# Patient Record
Sex: FEMALE | Race: WHITE | URBAN - METROPOLITAN AREA
[De-identification: names, ages, dates, MRNs, and addresses within clinical notes are randomized per-mention and may not be internally consistent; named-entity substitution may affect disease eponyms.]

---

## 2022-01-20 ENCOUNTER — WEB ENCOUNTER (OUTPATIENT)
Dept: URBAN - METROPOLITAN AREA CLINIC 98 | Facility: CLINIC | Age: 52
End: 2022-01-20

## 2022-01-20 ENCOUNTER — OFFICE VISIT (OUTPATIENT)
Dept: URBAN - METROPOLITAN AREA CLINIC 98 | Facility: CLINIC | Age: 52
End: 2022-01-20
Payer: COMMERCIAL

## 2022-01-20 DIAGNOSIS — Z80.0 FAMILY HISTORY OF COLON CANCER: ICD-10-CM

## 2022-01-20 DIAGNOSIS — R10.13 DYSPEPSIA: ICD-10-CM

## 2022-01-20 PROCEDURE — 99214 OFFICE O/P EST MOD 30 MIN: CPT | Performed by: INTERNAL MEDICINE

## 2022-01-20 RX ORDER — LIRAGLUTIDE 6 MG/ML
AS DIRECTED INJECTION, SOLUTION SUBCUTANEOUS
Status: ACTIVE | COMMUNITY

## 2022-01-20 RX ORDER — POLYETHYLENE GLYCOL 3350, SODIUM CHLORIDE, SODIUM BICARBONATE, POTASSIUM CHLORIDE 420; 11.2; 5.72; 1.48 G/4L; G/4L; G/4L; G/4L
AS DIRECTED POWDER, FOR SOLUTION ORAL ONCE
Qty: 420 GM | Refills: 0 | OUTPATIENT
Start: 2022-01-20 | End: 2022-01-21

## 2022-01-20 RX ORDER — PANTOPRAZOLE SODIUM 40 MG/1
1 TABLET TABLET, DELAYED RELEASE ORAL ONCE A DAY
Status: ACTIVE | COMMUNITY

## 2022-01-20 RX ORDER — PANTOPRAZOLE SODIUM 40 MG/1
1 TABLET TABLET, DELAYED RELEASE ORAL ONCE A DAY
OUTPATIENT

## 2022-01-20 RX ORDER — LISDEXAMFETAMINE DIMESYLATE 60 MG/1
1 CAPSULE IN THE MORNING CAPSULE ORAL ONCE A DAY
Status: ACTIVE | COMMUNITY

## 2022-01-20 RX ORDER — CLONAZEPAM 1 MG/1
TABLET ORAL
Qty: 0 | Refills: 0 | Status: ACTIVE | COMMUNITY
Start: 1900-01-01

## 2022-01-20 NOTE — HPI-TODAY'S VISIT:
Ms. Wilson is a 52 yo F presenting for followup for colonoscopy. Her last was in 2019 with suboptimal prep. Her father and his siblings had colon cancer.   In 2019 she had cholecystectomy for biliary colic.   She is on sour stomach while taking Saxenda for weight loss. She has a history of heartburn requiring omeprazole in the past and switched to pantoprazole 40 mg every few days (needs it 3-4 days per week). These medications help with her sour stomach.   No fever or chills. She does have some occasional lower back pain.   She has a soft BM daily, no blood.   I personally reviewed:  3/2019 colonoscopy: stool in colon, diverticulosis. Next colonoscopy due in 3 years (2022)

## 2022-03-14 ENCOUNTER — WEB ENCOUNTER (OUTPATIENT)
Dept: URBAN - METROPOLITAN AREA SURGERY CENTER 18 | Facility: SURGERY CENTER | Age: 52
End: 2022-03-14

## 2022-03-15 ENCOUNTER — OFFICE VISIT (OUTPATIENT)
Dept: URBAN - METROPOLITAN AREA SURGERY CENTER 18 | Facility: SURGERY CENTER | Age: 52
End: 2022-03-15
Payer: COMMERCIAL

## 2022-03-15 ENCOUNTER — CLAIMS CREATED FROM THE CLAIM WINDOW (OUTPATIENT)
Dept: URBAN - METROPOLITAN AREA CLINIC 4 | Facility: CLINIC | Age: 52
End: 2022-03-15
Payer: COMMERCIAL

## 2022-03-15 DIAGNOSIS — Z80.0 BROTHER AT YOUNG AGE FAMILY HISTORY OF COLON CANCER: ICD-10-CM

## 2022-03-15 DIAGNOSIS — K63.89 BACTERIAL OVERGROWTH SYNDROME: ICD-10-CM

## 2022-03-15 DIAGNOSIS — K63.89 SMALL BOWEL EDEMA: ICD-10-CM

## 2022-03-15 PROCEDURE — 45380 COLONOSCOPY AND BIOPSY: CPT | Performed by: INTERNAL MEDICINE

## 2022-03-15 PROCEDURE — 88305 TISSUE EXAM BY PATHOLOGIST: CPT | Performed by: PATHOLOGY

## 2022-03-15 PROCEDURE — G8907 PT DOC NO EVENTS ON DISCHARG: HCPCS | Performed by: INTERNAL MEDICINE

## 2022-03-15 RX ORDER — CLONAZEPAM 1 MG/1
TABLET ORAL
Qty: 0 | Refills: 0 | Status: ACTIVE | COMMUNITY
Start: 1900-01-01

## 2022-03-15 RX ORDER — PANTOPRAZOLE SODIUM 40 MG/1
1 TABLET TABLET, DELAYED RELEASE ORAL ONCE A DAY
Status: ACTIVE | COMMUNITY

## 2022-03-15 RX ORDER — LIRAGLUTIDE 6 MG/ML
AS DIRECTED INJECTION, SOLUTION SUBCUTANEOUS
Status: ACTIVE | COMMUNITY

## 2022-03-15 RX ORDER — LISDEXAMFETAMINE DIMESYLATE 60 MG/1
1 CAPSULE IN THE MORNING CAPSULE ORAL ONCE A DAY
Status: ACTIVE | COMMUNITY

## 2023-03-01 ENCOUNTER — DASHBOARD ENCOUNTERS (OUTPATIENT)
Age: 53
End: 2023-03-01

## 2023-03-01 ENCOUNTER — OFFICE VISIT (OUTPATIENT)
Dept: URBAN - METROPOLITAN AREA CLINIC 98 | Facility: CLINIC | Age: 53
End: 2023-03-01
Payer: COMMERCIAL

## 2023-03-01 VITALS
HEART RATE: 85 BPM | DIASTOLIC BLOOD PRESSURE: 87 MMHG | SYSTOLIC BLOOD PRESSURE: 197 MMHG | HEIGHT: 65 IN | TEMPERATURE: 97.85 F

## 2023-03-01 DIAGNOSIS — R12 HEARTBURN: ICD-10-CM

## 2023-03-01 DIAGNOSIS — M54.6 PAIN IN THORACIC SPINE: ICD-10-CM

## 2023-03-01 DIAGNOSIS — G89.29 OTHER CHRONIC PAIN: ICD-10-CM

## 2023-03-01 DIAGNOSIS — K57.92 DIVERTICULITIS: ICD-10-CM

## 2023-03-01 DIAGNOSIS — R10.9 LEFT SIDED ABDOMINAL PAIN: ICD-10-CM

## 2023-03-01 PROBLEM — 82423001: Status: ACTIVE | Noted: 2023-03-01

## 2023-03-01 PROCEDURE — 99214 OFFICE O/P EST MOD 30 MIN: CPT | Performed by: INTERNAL MEDICINE

## 2023-03-01 RX ORDER — CLONAZEPAM 1 MG/1
TABLET ORAL
Qty: 0 | Refills: 0 | Status: ACTIVE | COMMUNITY
Start: 1900-01-01

## 2023-03-01 RX ORDER — OMEPRAZOLE 40 MG/1
1 CAPSULE 30 MINUTES BEFORE MORNING MEAL CAPSULE, DELAYED RELEASE ORAL ONCE A DAY
Qty: 30 | Refills: 1 | OUTPATIENT

## 2023-03-01 RX ORDER — LISDEXAMFETAMINE DIMESYLATE 60 MG/1
1 CAPSULE IN THE MORNING CAPSULE ORAL ONCE A DAY
Status: ACTIVE | COMMUNITY

## 2023-03-01 NOTE — HPI-TODAY'S VISIT:
Ms. Wilson is a 51 yo F presenting for followup visit.   Tried Saxenda - a lot of GI issues.  Now on Wegovy.   Has a history of back pain. Had back xray done- some abnormalities in the spine. Present for 6 months, daily.   She also has some heartburn since being on Wegovy- and is now on omeprazole 40 daily. No trouble swallowing. NO vomiting or nausea.  Has history of diverticulitis, concerned for flare. No change in BMs. Having 2-3 BMs per day, not watery. No blood in the stools.   Weight is stable.   No family history of pancreatic disease No NSAID use  I reviewed: 3/15/22 Colonoscopy: diverticulosis, erythema - biopsied 3/15/22 colon path: normal path, next colonoscopy due in 5 years

## 2023-03-01 NOTE — EXAM-FUNCTIONAL ASSESSMENT
Constitutional: well-developed, normal communication ability.   Eyes: Conjunctivae and eyelids appear normal, no scleral icterus. Respiratory: symmetric expansion of chest wall, normal work of breathing   Gastrointestinal:  normoactive bowel sounds, soft, left lower abdominal tenderness, no rebound tenderness, no shifting dullness, no organomegaly.   Musculoskeletal: normal gait and station   Skin: no jaundice   Neurologic: Oriented to person, place, time. Short term memory intact.    Psychiatric: Normal mood and appropriate affect.

## 2023-04-05 ENCOUNTER — TELEPHONE ENCOUNTER (OUTPATIENT)
Dept: URBAN - METROPOLITAN AREA CLINIC 95 | Facility: CLINIC | Age: 53
End: 2023-04-05

## 2023-04-07 ENCOUNTER — TELEPHONE ENCOUNTER (OUTPATIENT)
Dept: URBAN - METROPOLITAN AREA CLINIC 95 | Facility: CLINIC | Age: 53
End: 2023-04-07